# Patient Record
Sex: FEMALE | Race: WHITE | NOT HISPANIC OR LATINO | URBAN - METROPOLITAN AREA
[De-identification: names, ages, dates, MRNs, and addresses within clinical notes are randomized per-mention and may not be internally consistent; named-entity substitution may affect disease eponyms.]

---

## 2023-03-07 ENCOUNTER — OFFICE VISIT (OUTPATIENT)
Dept: URBAN - METROPOLITAN AREA CLINIC 70 | Facility: CLINIC | Age: 33
End: 2023-03-07

## 2023-03-15 ENCOUNTER — OFFICE VISIT (OUTPATIENT)
Dept: URBAN - METROPOLITAN AREA CLINIC 94 | Facility: CLINIC | Age: 33
End: 2023-03-15

## 2023-03-29 ENCOUNTER — CLAIMS CREATED FROM THE CLAIM WINDOW (OUTPATIENT)
Dept: URBAN - METROPOLITAN AREA CLINIC 94 | Facility: CLINIC | Age: 33
End: 2023-03-29
Payer: COMMERCIAL

## 2023-03-29 ENCOUNTER — DASHBOARD ENCOUNTERS (OUTPATIENT)
Age: 33
End: 2023-03-29

## 2023-03-29 ENCOUNTER — LAB OUTSIDE AN ENCOUNTER (OUTPATIENT)
Dept: URBAN - METROPOLITAN AREA CLINIC 94 | Facility: CLINIC | Age: 33
End: 2023-03-29

## 2023-03-29 ENCOUNTER — WEB ENCOUNTER (OUTPATIENT)
Dept: URBAN - METROPOLITAN AREA CLINIC 94 | Facility: CLINIC | Age: 33
End: 2023-03-29

## 2023-03-29 VITALS
HEIGHT: 65 IN | TEMPERATURE: 97.3 F | DIASTOLIC BLOOD PRESSURE: 90 MMHG | HEART RATE: 73 BPM | SYSTOLIC BLOOD PRESSURE: 161 MMHG | WEIGHT: 287 LBS | BODY MASS INDEX: 47.82 KG/M2

## 2023-03-29 DIAGNOSIS — R10.30 LOWER ABDOMINAL PAIN: ICD-10-CM

## 2023-03-29 DIAGNOSIS — R19.8 ALTERNATING CONSTIPATION AND DIARRHEA: ICD-10-CM

## 2023-03-29 DIAGNOSIS — R11.0 NAUSEA: ICD-10-CM

## 2023-03-29 DIAGNOSIS — R12 HEART BURN: ICD-10-CM

## 2023-03-29 PROCEDURE — 99204 OFFICE O/P NEW MOD 45 MIN: CPT | Performed by: PHYSICIAN ASSISTANT

## 2023-03-29 PROCEDURE — 99204 OFFICE O/P NEW MOD 45 MIN: CPT | Performed by: INTERNAL MEDICINE

## 2023-03-29 RX ORDER — OMEPRAZOLE 40 MG/1
1 CAPSULE 30 MINUTES BEFORE MORNING MEAL CAPSULE, DELAYED RELEASE ORAL ONCE A DAY
Qty: 30 | Refills: 3 | OUTPATIENT
Start: 2023-03-29

## 2023-03-29 NOTE — HPI-TODAY'S VISIT:
33 yo F evaluated today for hx of lower abdominal pain.   Pt reports chronic lower abdominal pain. Pain is constant but worse in the am. Described as a cramping/achy pain.Pt alternates with constipation and diarrhea. She reports occasional solids stools. DEnies melena or hematochezia.   Pt will use Miralax when she has constipation and if diarrhea, she will stop. Pt has tried fiber supplements and laxatives OTC.  Pt reports hx of anxiety which makes sx worse.    She also reports am nausea with vomiting. Heart burn on occasion. Denies dysphagia.    Pt denies family hx Crohn's or Ulcerative Colitis.

## 2023-03-31 LAB
C-REACTIVE PROTEIN, QUANT: 8.9
IMMUNOGLOBULIN A, QN, SERUM: 233
T-TRANSGLUTAMINASE (TTG) IGA: <1

## 2023-04-27 ENCOUNTER — OFFICE VISIT (OUTPATIENT)
Dept: URBAN - METROPOLITAN AREA CLINIC 94 | Facility: CLINIC | Age: 33
End: 2023-04-27

## 2023-04-27 RX ORDER — OMEPRAZOLE 40 MG/1
1 CAPSULE 30 MINUTES BEFORE MORNING MEAL CAPSULE, DELAYED RELEASE ORAL ONCE A DAY
Qty: 30 | Refills: 3 | Status: ACTIVE | COMMUNITY
Start: 2023-03-29